# Patient Record
Sex: FEMALE | Race: ASIAN | NOT HISPANIC OR LATINO | URBAN - METROPOLITAN AREA
[De-identification: names, ages, dates, MRNs, and addresses within clinical notes are randomized per-mention and may not be internally consistent; named-entity substitution may affect disease eponyms.]

---

## 2017-05-10 ENCOUNTER — EMERGENCY (EMERGENCY)
Facility: HOSPITAL | Age: 42
LOS: 1 days | Discharge: HOME | End: 2017-05-10
Attending: EMERGENCY MEDICINE | Admitting: EMERGENCY MEDICINE
Payer: COMMERCIAL

## 2017-05-10 VITALS
RESPIRATION RATE: 16 BRPM | OXYGEN SATURATION: 100 % | HEART RATE: 78 BPM | DIASTOLIC BLOOD PRESSURE: 70 MMHG | TEMPERATURE: 98 F | SYSTOLIC BLOOD PRESSURE: 115 MMHG

## 2017-05-10 VITALS
DIASTOLIC BLOOD PRESSURE: 83 MMHG | WEIGHT: 156.31 LBS | HEIGHT: 64 IN | RESPIRATION RATE: 18 BRPM | TEMPERATURE: 98 F | SYSTOLIC BLOOD PRESSURE: 117 MMHG | OXYGEN SATURATION: 97 % | HEART RATE: 90 BPM

## 2017-05-10 DIAGNOSIS — R55 SYNCOPE AND COLLAPSE: ICD-10-CM

## 2017-05-10 DIAGNOSIS — Z79.2 LONG TERM (CURRENT) USE OF ANTIBIOTICS: ICD-10-CM

## 2017-05-10 DIAGNOSIS — Z79.899 OTHER LONG TERM (CURRENT) DRUG THERAPY: ICD-10-CM

## 2017-05-10 LAB
ALBUMIN SERPL ELPH-MCNC: 3.6 G/DL — SIGNIFICANT CHANGE UP (ref 3.4–5)
ALP SERPL-CCNC: 42 U/L — SIGNIFICANT CHANGE UP (ref 40–120)
ALT FLD-CCNC: 38 U/L — SIGNIFICANT CHANGE UP (ref 12–42)
ANION GAP SERPL CALC-SCNC: 2 MMOL/L — LOW (ref 9–16)
APPEARANCE UR: SIGNIFICANT CHANGE UP
AST SERPL-CCNC: 27 U/L — SIGNIFICANT CHANGE UP (ref 15–37)
BASOPHILS NFR BLD AUTO: 0.6 % — SIGNIFICANT CHANGE UP (ref 0–2)
BILIRUB SERPL-MCNC: 0.3 MG/DL — SIGNIFICANT CHANGE UP (ref 0.2–1.2)
BILIRUB UR-MCNC: NEGATIVE — SIGNIFICANT CHANGE UP
BUN SERPL-MCNC: 10 MG/DL — SIGNIFICANT CHANGE UP (ref 7–23)
CALCIUM SERPL-MCNC: 9.1 MG/DL — SIGNIFICANT CHANGE UP (ref 8.5–10.5)
CHLORIDE SERPL-SCNC: 107 MMOL/L — SIGNIFICANT CHANGE UP (ref 96–108)
CO2 SERPL-SCNC: 30 MMOL/L — SIGNIFICANT CHANGE UP (ref 22–31)
COLOR SPEC: YELLOW — SIGNIFICANT CHANGE UP
CREAT SERPL-MCNC: 0.67 MG/DL — SIGNIFICANT CHANGE UP (ref 0.5–1.3)
DIFF PNL FLD: (no result)
EOSINOPHIL NFR BLD AUTO: 4.5 % — SIGNIFICANT CHANGE UP (ref 0–6)
GLUCOSE SERPL-MCNC: 119 MG/DL — HIGH (ref 70–99)
GLUCOSE UR QL: NEGATIVE — SIGNIFICANT CHANGE UP
HCT VFR BLD CALC: 38.3 % — SIGNIFICANT CHANGE UP (ref 34.5–45)
HGB BLD-MCNC: 13.1 G/DL — SIGNIFICANT CHANGE UP (ref 11.5–15.5)
KETONES UR-MCNC: NEGATIVE — SIGNIFICANT CHANGE UP
LEUKOCYTE ESTERASE UR-ACNC: (no result)
LYMPHOCYTES # BLD AUTO: 24.4 % — SIGNIFICANT CHANGE UP (ref 13–44)
MCHC RBC-ENTMCNC: 30.8 PG — SIGNIFICANT CHANGE UP (ref 27–34)
MCHC RBC-ENTMCNC: 34.2 G/DL — SIGNIFICANT CHANGE UP (ref 32–36)
MCV RBC AUTO: 90.1 FL — SIGNIFICANT CHANGE UP (ref 80–100)
MONOCYTES NFR BLD AUTO: 9.3 % — SIGNIFICANT CHANGE UP (ref 2–14)
NEUTROPHILS NFR BLD AUTO: 61.2 % — SIGNIFICANT CHANGE UP (ref 43–77)
NITRITE UR-MCNC: NEGATIVE — SIGNIFICANT CHANGE UP
PH UR: 6 — SIGNIFICANT CHANGE UP (ref 5–8)
PLATELET # BLD AUTO: 279 K/UL — SIGNIFICANT CHANGE UP (ref 150–400)
POTASSIUM SERPL-MCNC: 4.3 MMOL/L — SIGNIFICANT CHANGE UP (ref 3.5–5.3)
POTASSIUM SERPL-SCNC: 4.3 MMOL/L — SIGNIFICANT CHANGE UP (ref 3.5–5.3)
PROT SERPL-MCNC: 7.3 G/DL — SIGNIFICANT CHANGE UP (ref 6.4–8.2)
PROT UR-MCNC: NEGATIVE MG/DL — SIGNIFICANT CHANGE UP
RBC # BLD: 4.25 M/UL — SIGNIFICANT CHANGE UP (ref 3.8–5.2)
RBC # FLD: 13.5 % — SIGNIFICANT CHANGE UP (ref 10.3–16.9)
SODIUM SERPL-SCNC: 139 MMOL/L — SIGNIFICANT CHANGE UP (ref 135–145)
SP GR SPEC: 1.01 — SIGNIFICANT CHANGE UP (ref 1–1.03)
UROBILINOGEN FLD QL: 0.2 E.U./DL — SIGNIFICANT CHANGE UP
WBC # BLD: 8.6 K/UL — SIGNIFICANT CHANGE UP (ref 3.8–10.5)
WBC # FLD AUTO: 8.6 K/UL — SIGNIFICANT CHANGE UP (ref 3.8–10.5)

## 2017-05-10 PROCEDURE — 70450 CT HEAD/BRAIN W/O DYE: CPT | Mod: 26

## 2017-05-10 PROCEDURE — 87086 URINE CULTURE/COLONY COUNT: CPT

## 2017-05-10 PROCEDURE — 99285 EMERGENCY DEPT VISIT HI MDM: CPT | Mod: 25

## 2017-05-10 PROCEDURE — 85025 COMPLETE CBC W/AUTO DIFF WBC: CPT

## 2017-05-10 PROCEDURE — 80053 COMPREHEN METABOLIC PANEL: CPT

## 2017-05-10 PROCEDURE — 81001 URINALYSIS AUTO W/SCOPE: CPT

## 2017-05-10 PROCEDURE — 93010 ELECTROCARDIOGRAM REPORT: CPT | Mod: NC

## 2017-05-10 PROCEDURE — 99284 EMERGENCY DEPT VISIT MOD MDM: CPT | Mod: 25

## 2017-05-10 PROCEDURE — 70450 CT HEAD/BRAIN W/O DYE: CPT

## 2017-05-10 PROCEDURE — 93005 ELECTROCARDIOGRAM TRACING: CPT

## 2017-05-10 PROCEDURE — 36415 COLL VENOUS BLD VENIPUNCTURE: CPT

## 2017-05-10 RX ORDER — NITROFURANTOIN MACROCRYSTAL 50 MG
1 CAPSULE ORAL
Qty: 14 | Refills: 0
Start: 2017-05-10 | End: 2017-05-17

## 2017-05-10 NOTE — ED PROVIDER NOTE - ATTENDING CONTRIBUTION TO CARE
40 yo female with brief syncopal event while walking 1 hr ago, pos [facility] - no focal neuro deficits on presentation- no ekg abnormalities noted - not orthostatic- no cardiac murmurs detected on exam - no other sx noted no tongue bite or evidence to suggest seizure.  labs and heat Ct unremarkable - VSS  well appearing now ambulates normally in ED may be dced

## 2017-05-10 NOTE — ED PROVIDER NOTE - MEDICAL DECISION MAKING DETAILS
40 y/o f syncopal episode today; EKG normal sinus, labs wnl, urine consistent with infection, culture sent.  Neuro exam intact,  CT head pending.  If head CT negative, possible post-concussive symptoms, will treat urine with macrobid.

## 2017-05-10 NOTE — ED PROVIDER NOTE - OBJECTIVE STATEMENT
40 y/o f with no pmh presents stating had a syncopal episode this morning around 8am.  Pt stating she was walking to work, had eaten breakfast, felt as though she was going to pass out, when asked to elaborate she is unable, stating it didn't feel dizzy, had no CP or SOB.  Pt stating she fell to the ground, hit her head, was witnessed by a bystander who reports she lost consciousness briefly.  Pt stating she went to work, has been having mild headache and "I just don't feel like myself."  Pt reports nausea, no vomiting.  Denies fever, abd pain, dysuria, all other ROS negative. 42 y/o f with no pmh presents stating had a syncopal episode this morning around 8 am.  Pt stating she was walking to work, had eaten breakfast, felt as though she was going to pass out, when asked to elaborate she is unable, stating it didn't feel dizzy, had no CP or SOB.  Pt stating she fell to the ground, hit her head, was witnessed by a bystander who reports she lost consciousness briefly.  Pt stating she went to work, has been having mild headache and "I just don't feel like myself."  Pt reports nausea, no vomiting.  Denies fever, abd pain, dysuria, all other ROS negative.

## 2017-05-12 LAB
CULTURE RESULTS: NO GROWTH — SIGNIFICANT CHANGE UP
SPECIMEN SOURCE: SIGNIFICANT CHANGE UP

## 2020-07-27 ENCOUNTER — INPATIENT (INPATIENT)
Facility: HOSPITAL | Age: 45
LOS: 0 days | Discharge: HOME | DRG: 379 | End: 2020-07-28
Attending: HOSPITALIST | Admitting: HOSPITALIST
Payer: COMMERCIAL

## 2020-07-27 VITALS
SYSTOLIC BLOOD PRESSURE: 127 MMHG | TEMPERATURE: 99 F | RESPIRATION RATE: 17 BRPM | WEIGHT: 169.98 LBS | OXYGEN SATURATION: 97 % | DIASTOLIC BLOOD PRESSURE: 90 MMHG | HEART RATE: 86 BPM | HEIGHT: 64 IN

## 2020-07-27 DIAGNOSIS — Z29.9 ENCOUNTER FOR PROPHYLACTIC MEASURES, UNSPECIFIED: ICD-10-CM

## 2020-07-27 LAB
ALBUMIN SERPL ELPH-MCNC: 3.9 G/DL — SIGNIFICANT CHANGE UP (ref 3.3–5)
ALP SERPL-CCNC: 54 U/L — SIGNIFICANT CHANGE UP (ref 40–120)
ALT FLD-CCNC: 7 U/L — LOW (ref 10–45)
ANION GAP SERPL CALC-SCNC: 8 MMOL/L — SIGNIFICANT CHANGE UP (ref 5–17)
APPEARANCE UR: CLEAR — SIGNIFICANT CHANGE UP
APTT BLD: 32.2 SEC — SIGNIFICANT CHANGE UP (ref 27.5–35.5)
AST SERPL-CCNC: 14 U/L — SIGNIFICANT CHANGE UP (ref 10–40)
BASOPHILS # BLD AUTO: 0.03 K/UL — SIGNIFICANT CHANGE UP (ref 0–0.2)
BASOPHILS NFR BLD AUTO: 0.3 % — SIGNIFICANT CHANGE UP (ref 0–2)
BILIRUB SERPL-MCNC: 0.2 MG/DL — SIGNIFICANT CHANGE UP (ref 0.2–1.2)
BILIRUB UR-MCNC: NEGATIVE — SIGNIFICANT CHANGE UP
BLD GP AB SCN SERPL QL: NEGATIVE — SIGNIFICANT CHANGE UP
BUN SERPL-MCNC: 10 MG/DL — SIGNIFICANT CHANGE UP (ref 7–23)
CALCIUM SERPL-MCNC: 8.8 MG/DL — SIGNIFICANT CHANGE UP (ref 8.4–10.5)
CHLORIDE SERPL-SCNC: 106 MMOL/L — SIGNIFICANT CHANGE UP (ref 96–108)
CO2 SERPL-SCNC: 27 MMOL/L — SIGNIFICANT CHANGE UP (ref 22–31)
COLOR SPEC: YELLOW — SIGNIFICANT CHANGE UP
CREAT SERPL-MCNC: 1.01 MG/DL — SIGNIFICANT CHANGE UP (ref 0.5–1.3)
DIFF PNL FLD: ABNORMAL
EOSINOPHIL # BLD AUTO: 0.06 K/UL — SIGNIFICANT CHANGE UP (ref 0–0.5)
EOSINOPHIL NFR BLD AUTO: 0.7 % — SIGNIFICANT CHANGE UP (ref 0–6)
GLUCOSE SERPL-MCNC: 132 MG/DL — HIGH (ref 70–99)
GLUCOSE UR QL: NEGATIVE — SIGNIFICANT CHANGE UP
HCT VFR BLD CALC: 32.8 % — LOW (ref 34.5–45)
HGB BLD-MCNC: 10.6 G/DL — LOW (ref 11.5–15.5)
IMM GRANULOCYTES NFR BLD AUTO: 0.2 % — SIGNIFICANT CHANGE UP (ref 0–1.5)
INR BLD: 1.04 — SIGNIFICANT CHANGE UP (ref 0.88–1.16)
KETONES UR-MCNC: NEGATIVE — SIGNIFICANT CHANGE UP
LEUKOCYTE ESTERASE UR-ACNC: ABNORMAL
LYMPHOCYTES # BLD AUTO: 1.36 K/UL — SIGNIFICANT CHANGE UP (ref 1–3.3)
LYMPHOCYTES # BLD AUTO: 15.8 % — SIGNIFICANT CHANGE UP (ref 13–44)
MCHC RBC-ENTMCNC: 30.4 PG — SIGNIFICANT CHANGE UP (ref 27–34)
MCHC RBC-ENTMCNC: 32.3 GM/DL — SIGNIFICANT CHANGE UP (ref 32–36)
MCV RBC AUTO: 94 FL — SIGNIFICANT CHANGE UP (ref 80–100)
MONOCYTES # BLD AUTO: 0.54 K/UL — SIGNIFICANT CHANGE UP (ref 0–0.9)
MONOCYTES NFR BLD AUTO: 6.3 % — SIGNIFICANT CHANGE UP (ref 2–14)
NEUTROPHILS # BLD AUTO: 6.58 K/UL — SIGNIFICANT CHANGE UP (ref 1.8–7.4)
NEUTROPHILS NFR BLD AUTO: 76.7 % — SIGNIFICANT CHANGE UP (ref 43–77)
NITRITE UR-MCNC: NEGATIVE — SIGNIFICANT CHANGE UP
NRBC # BLD: 0 /100 WBCS — SIGNIFICANT CHANGE UP (ref 0–0)
PH UR: 7.5 — SIGNIFICANT CHANGE UP (ref 5–8)
PLATELET # BLD AUTO: 320 K/UL — SIGNIFICANT CHANGE UP (ref 150–400)
POTASSIUM SERPL-MCNC: 4 MMOL/L — SIGNIFICANT CHANGE UP (ref 3.5–5.3)
POTASSIUM SERPL-SCNC: 4 MMOL/L — SIGNIFICANT CHANGE UP (ref 3.5–5.3)
PROT SERPL-MCNC: 6.7 G/DL — SIGNIFICANT CHANGE UP (ref 6–8.3)
PROT UR-MCNC: NEGATIVE MG/DL — SIGNIFICANT CHANGE UP
PROTHROM AB SERPL-ACNC: 12.5 SEC — SIGNIFICANT CHANGE UP (ref 10.6–13.6)
RBC # BLD: 3.49 M/UL — LOW (ref 3.8–5.2)
RBC # FLD: 13.6 % — SIGNIFICANT CHANGE UP (ref 10.3–14.5)
RH IG SCN BLD-IMP: POSITIVE — SIGNIFICANT CHANGE UP
SARS-COV-2 RNA SPEC QL NAA+PROBE: SIGNIFICANT CHANGE UP
SODIUM SERPL-SCNC: 141 MMOL/L — SIGNIFICANT CHANGE UP (ref 135–145)
SP GR SPEC: 1.01 — SIGNIFICANT CHANGE UP (ref 1–1.03)
UROBILINOGEN FLD QL: 0.2 E.U./DL — SIGNIFICANT CHANGE UP
WBC # BLD: 8.59 K/UL — SIGNIFICANT CHANGE UP (ref 3.8–10.5)
WBC # FLD AUTO: 8.59 K/UL — SIGNIFICANT CHANGE UP (ref 3.8–10.5)

## 2020-07-27 PROCEDURE — 74177 CT ABD & PELVIS W/CONTRAST: CPT | Mod: 26

## 2020-07-27 PROCEDURE — 99223 1ST HOSP IP/OBS HIGH 75: CPT

## 2020-07-27 PROCEDURE — 99285 EMERGENCY DEPT VISIT HI MDM: CPT | Mod: CR

## 2020-07-27 PROCEDURE — 99222 1ST HOSP IP/OBS MODERATE 55: CPT | Mod: GC

## 2020-07-27 RX ORDER — PIPERACILLIN AND TAZOBACTAM 4; .5 G/20ML; G/20ML
3.38 INJECTION, POWDER, LYOPHILIZED, FOR SOLUTION INTRAVENOUS EVERY 6 HOURS
Refills: 0 | Status: DISCONTINUED | OUTPATIENT
Start: 2020-07-27 | End: 2020-07-28

## 2020-07-27 RX ORDER — PANTOPRAZOLE SODIUM 20 MG/1
8 TABLET, DELAYED RELEASE ORAL
Qty: 80 | Refills: 0 | Status: DISCONTINUED | OUTPATIENT
Start: 2020-07-27 | End: 2020-07-28

## 2020-07-27 RX ORDER — METRONIDAZOLE 500 MG
500 TABLET ORAL ONCE
Refills: 0 | Status: COMPLETED | OUTPATIENT
Start: 2020-07-27 | End: 2020-07-27

## 2020-07-27 RX ORDER — PIPERACILLIN AND TAZOBACTAM 4; .5 G/20ML; G/20ML
3.38 INJECTION, POWDER, LYOPHILIZED, FOR SOLUTION INTRAVENOUS ONCE
Refills: 0 | Status: DISCONTINUED | OUTPATIENT
Start: 2020-07-27 | End: 2020-07-27

## 2020-07-27 RX ORDER — CEFTRIAXONE 500 MG/1
1000 INJECTION, POWDER, FOR SOLUTION INTRAMUSCULAR; INTRAVENOUS ONCE
Refills: 0 | Status: COMPLETED | OUTPATIENT
Start: 2020-07-27 | End: 2020-07-27

## 2020-07-27 RX ORDER — PIPERACILLIN AND TAZOBACTAM 4; .5 G/20ML; G/20ML
3.38 INJECTION, POWDER, LYOPHILIZED, FOR SOLUTION INTRAVENOUS EVERY 6 HOURS
Refills: 0 | Status: DISCONTINUED | OUTPATIENT
Start: 2020-07-27 | End: 2020-07-27

## 2020-07-27 RX ORDER — PANTOPRAZOLE SODIUM 20 MG/1
40 TABLET, DELAYED RELEASE ORAL ONCE
Refills: 0 | Status: COMPLETED | OUTPATIENT
Start: 2020-07-27 | End: 2020-07-27

## 2020-07-27 RX ORDER — SODIUM CHLORIDE 9 MG/ML
1000 INJECTION INTRAMUSCULAR; INTRAVENOUS; SUBCUTANEOUS ONCE
Refills: 0 | Status: COMPLETED | OUTPATIENT
Start: 2020-07-27 | End: 2020-07-27

## 2020-07-27 RX ORDER — IOHEXOL 300 MG/ML
30 INJECTION, SOLUTION INTRAVENOUS ONCE
Refills: 0 | Status: COMPLETED | OUTPATIENT
Start: 2020-07-27 | End: 2020-07-27

## 2020-07-27 RX ADMIN — CEFTRIAXONE 100 MILLIGRAM(S): 500 INJECTION, POWDER, FOR SOLUTION INTRAMUSCULAR; INTRAVENOUS at 13:42

## 2020-07-27 RX ADMIN — IOHEXOL 30 MILLILITER(S): 300 INJECTION, SOLUTION INTRAVENOUS at 11:08

## 2020-07-27 RX ADMIN — PANTOPRAZOLE SODIUM 10 MG/HR: 20 TABLET, DELAYED RELEASE ORAL at 14:53

## 2020-07-27 RX ADMIN — PANTOPRAZOLE SODIUM 40 MILLIGRAM(S): 20 TABLET, DELAYED RELEASE ORAL at 11:08

## 2020-07-27 RX ADMIN — SODIUM CHLORIDE 1000 MILLILITER(S): 9 INJECTION INTRAMUSCULAR; INTRAVENOUS; SUBCUTANEOUS at 11:08

## 2020-07-27 RX ADMIN — PIPERACILLIN AND TAZOBACTAM 200 GRAM(S): 4; .5 INJECTION, POWDER, LYOPHILIZED, FOR SOLUTION INTRAVENOUS at 21:08

## 2020-07-27 RX ADMIN — Medication 100 MILLIGRAM(S): at 14:17

## 2020-07-27 NOTE — H&P ADULT - PROBLEM SELECTOR PLAN 3
Pt had UA with >10 WBC, moderate leuk esterase. Has had no dysuria, urgency, incontinence.  -As patient has no urinary symptoms, will not treat at this time

## 2020-07-27 NOTE — H&P ADULT - NSHPPHYSICALEXAM_GEN_ALL_CORE
Vital Signs Last 12 Hrs  T(F): 97.4 (07-27-20 @ 13:53), Max: 99.1 (07-27-20 @ 09:52)  HR: 73 (07-27-20 @ 13:53) (73 - 86)  BP: 129/83 (07-27-20 @ 13:53) (127/90 - 129/83)  BP(mean): --  RR: 18 (07-27-20 @ 13:53) (17 - 18)  SpO2: 100% (07-27-20 @ 13:53) (97% - 100%)    PHYSICAL EXAM:  Constitutional: NAD, comfortable in bed.  HEENT: NC/AT, PERRLA, EOMI, no conjunctival pallor or scleral icterus, MMM  Neck: Supple, no JVD  Respiratory: CTA B/L. No w/r/r.   Cardiovascular: RRR, normal S1 and S2, no m/r/g.   Gastrointestinal: +BS, soft NTND, no guarding or rebound tenderness, no palpable masses   Extremities: wwp; no cyanosis, clubbing or edema.   Vascular: Pulses equal and strong throughout.   Neurological: AAOx3, no CN deficits, strength and sensation intact throughout.   Skin: No gross skin abnormalities or rashes

## 2020-07-27 NOTE — H&P ADULT - ASSESSMENT
44F PMH diverticulitis, daily NSAID use presents with episodes of bloody-streaked diarrhea, black stools over last 2 days. Hemodynamically stable, afebrile.

## 2020-07-27 NOTE — H&P ADULT - PROBLEM SELECTOR PLAN 1
Pt had black stools x 2 over last 2 days, as well as 1-2 episodes of reddish-maroon colored stool. Hgb 10.6 (Hgb 13 3 years ago). Has not had BM today. In history of chronic NSAID use and + melena on ED exam supports upper GI bleed.   -GI following, appreciate recs  -NPO after midnight for endoscopy   -Maintain active T&S  -Transfuse if Hgb < 7

## 2020-07-27 NOTE — CONSULT NOTE ADULT - ASSESSMENT
44F w/ a PMH significant for diverticulitis (August 2016) and migraines who presents to the ED w/ 2 days of hematochezia followed by melena. Hx significant for NSAID use. Previous Hgb 12.1 and 13.1, now presents with 10.6. Endorses lightheadedness. Also has LLQ pain, and CT findings consistent with acute diverticulitis. Of note, patient has previously had episode of diverticulitis in same area in 2016.     #Melena and hematochezia  Differential Diagnosis includes ulcer disease, angioectasias, esophagitis, erosive gastritis, dieulofoy lesion, cancer; could also be related to diverticular process occurring in colon  - Maintain active T&S, large bore IV access  - Transfusion threshold per primary team  - PPI IV gtt  - NPO at [facility] for EGD tomorrow    #Acute diverticulitis  - given acute inflammatory process in sigmoid colon, would hold off colonoscopic evaluation at this time  - agree with supportive care with abx, IVF  - patient can follow up with [facility] clinic on the fourth floor of [facility] [address] on Tuesday afternoons with myself and Dr. Simon ([phone]) to schedule colonoscopy    Haydee iKran MD  PGY-4, Gastroenterology Fellow  pager: [phone] 44F w/ a PMH significant for diverticulitis (August 2016) and migraines who presents to the ED w/ 2 days of hematochezia followed by melena. Hx significant for NSAID use. Previous Hgb 12.1 and 13.1, now presents with 10.6. Endorses lightheadedness. Also has LLQ pain, and CT findings consistent with acute diverticulitis. Of note, patient has previously had episode of diverticulitis in same area in 2016.     #Melena and hematochezia  Differential Diagnosis includes ulcer disease, angioectasias, esophagitis, erosive gastritis, dieulofoy lesion, cancer; could also be related to diverticular process occurring in colon  - Maintain active T&S, large bore IV access  - Transfusion threshold per primary team  - PPI IV gtt  - NPO at [facility] for EGD tomorrow    #Acute diverticulitis  - given acute inflammatory process in sigmoid colon, would hold off colonoscopic evaluation at this time  - agree with supportive care with IVF, bowel rest; will defer abx therapy to primary team  - patient can follow up with [facility] on the fourth floor of [address] on Tuesday afternoons with myself and Dr. Simon ([phone]) to schedule colonoscopy    Haydee Kiran MD  PGY-4, Gastroenterology Fellow  pager: [phone]

## 2020-07-27 NOTE — ED PROVIDER NOTE - CLINICAL SUMMARY MEDICAL DECISION MAKING FREE TEXT BOX
Patient well appearing, NAD and VSS. Hemodynamically stable with stable h/h. Ct scan shows diverticulitis with no perforation or abscess. Admitted for GI bleed and diverticulitis. Patient well appearing, NAD and VSS. Hemodynamically stable with stable h/h. Ct scan shows diverticulitis with no perforation or abscess. Admitted for GI bleed and diverticulitis. Case discuss with GI.

## 2020-07-27 NOTE — H&P ADULT - PROBLEM SELECTOR PLAN 2
Pt has had previous dx of diverticulitis years ago, currently has mild LLQ pain but says she has had on and off LLQ pain for years that is alleviated with BM. No h/o constipation. Abdominal exam benign. Pt afebrile, no leukocytosis, hemodynamically stable. CT abd reads possible mild recurrent diverticulitis vs chronic process.   -monitor CBC w diff  -monitor abdominal pain  -No abx at this time Pt has had previous dx of diverticulitis years ago, currently has mild LLQ pain with LLQ tenderness on exam with CT abd supporting mild recurrent diverticulitis dx. No h/o constipation .Pt afebrile, no leukocytosis, hemodynamically stable.   -zosyn 3.375g q6h  -monitor CBC w diff  -monitor abdominal pain

## 2020-07-27 NOTE — ED PROVIDER NOTE - ATTENDING CONTRIBUTION TO CARE
44F PMH migraine p/w blood stool. Had tooth extraction 6d ago and started on amox. No tooth bleeding. Now 3d of maroon colored stool, black stool yesterday. Also lightheaded, worse w/ standing. Mild nausea and fatigue. No prior EGD/colonoscopy. On motrin for tooth pain. Also minimal L abd discomfort x1-2d. Denies f/c, SOB/CP, vomiting, urinary complaints, rashes, LE pain/swelling, focal weakness/numbness. Not on AC. Vitals wnl, exam as above.  In ED: Mild anemia, other labs grossly wnl. CT w/ possible colitis. Given abd ttp, will tx for colitis.   Will admit for further care of likely UGIB.

## 2020-07-27 NOTE — H&P ADULT - HISTORY OF PRESENT ILLNESS
44F PMH diverticulitis, migraines presents with bloody stool x 2 days and 4 episodes of diarrhea over last 6 days. 6 days prior to admission, pt had a tooth extraction and started amoxicillin, which she says precipitated her diarrheal episodes. 4 days prior to admission, she says she has had intermittent sharp pains in her LLQ that continue to persist. 2 days prior to admission, her stool color changed to red/maroon for 1-2 episodes. Pt states over last 24 hours, she has had no diarrhea but has had black stools x 2. Of note, for years patient takes 200 mg ibuprofen 1x daily to control her migraines. Has been taking 600 mg ibuprofen daily for past 6 days to help with tooth pain. +lightheadedness, nausea, weakness. No fever/chills, vomiting, flank pain, urinary symptoms. No CP, SOB, sick contacts.     ED vitals: T 97.4, HR 73, /83 orthostatics negative, RR 18 SpO2 100% on RA   ED labs s/f: WBC 8.6, Hgb 10.6 (13 in '17), UA >10 WBC w moderate leuk esterase  In ED, received 1L NS, ceftriaxone 1g, flagyl 500 mg, protonix 40 IV, protonix gtt

## 2020-07-27 NOTE — ED PROVIDER NOTE - AGGRAVATING FACTORS
----- Message from Angela Brock sent at 11/15/2019 10:10 AM CST -----  Contact: daughter Venessa castillo [phone]  Patient's daughter Venessa Castillo called and asked for Breathing Machine and the supplies to be called into [facility] on [address].     
Message handled in another encounter.   
none

## 2020-07-27 NOTE — CONSULT NOTE ADULT - SUBJECTIVE AND OBJECTIVE BOX
GASTROENTEROLOGY CONSULT NOTE  HPI:  44F PMH diverticulitis, migraines presents with bloody stool x 2 days and 4 episodes of diarrhea over last 6 days. 6 days prior to admission, pt had a tooth extraction and started amoxicillin, which she says precipitated her diarrheal episodes. 4 days prior to admission, she says she has had intermittent sharp pains in her LLQ that continue to persist. 2 days prior to admission, her stool color changed to red/maroon for 1-2 episodes. Pt states over last 24 hours, she has had no diarrhea but has had black stools x 2. Of note, for years patient takes 200 mg ibuprofen 1x daily to control her migraines. Has been taking 600 mg ibuprofen daily for past 6 days to help with tooth pain. +lightheadedness, nausea, weakness. No fever/chills, vomiting, flank pain, urinary symptoms. No CP, SOB, sick contacts.     ED vitals: T 97.4, HR 73, /83 orthostatics negative, RR 18 SpO2 100% on RA   ED labs s/f: WBC 8.6, Hgb 10.6 (13 in '17), UA >10 WBC w moderate leuk esterase  In ED, received 1L NS, ceftriaxone 1g, flagyl 500 mg, protonix 40 IV, protonix gtt (27 Jul 2020 14:55)    GI consulted for melena. Above confirmed w/ slight following changes. Patient was on amoxicillin starting last Monday, and began to have diarrhea. 2 days prior to admission, patient noted 3 episodes of maroon colored stool, associated w/ LLQ tenderness. The following day, she noted black, tarry stool x2. She has not had any subsequent melena or hematochezia, and she has not had a BM since yesterday.     Allergies    No Known Allergies    Intolerances      Home Medications:    MEDICATIONS:  MEDICATIONS  (STANDING):  pantoprazole Infusion 8 mG/Hr (10 mL/Hr) IV Continuous <Continuous>    MEDICATIONS  (PRN):    PAST MEDICAL & SURGICAL HISTORY:  Migraines  No significant past surgical history    FAMILY HISTORY:  No pertinent family history in first degree relatives of GI cancer,  IBD, but mother has had diverticulitis     SOCIAL HISTORY:  Tobacco: [ ] Current, [ ] Former, [x ] Never; Pack Years:  Alcohol: none  Illicit Drugs: none    REVIEW OF SYSTEMS:  CONSTITUTIONAL: No weakness, fevers or chills  HEENT: No visual changes; No vertigo or throat pain   NECK: No pain or stiffness  RESPIRATORY: No cough, wheezing, hemoptysis; No shortness of breath  CARDIOVASCULAR: No chest pain or palpitations  GASTROINTESTINAL: No abdominal or epigastric pain. No nausea, vomiting, or hematemesis; No diarrhea or constipation. No melena or hematochezia.  GENITOURINARY: No dysuria, frequency or hematuria  NEUROLOGICAL: No numbness or weakness  SKIN: No itching, burning, rashes, or lesions   All other 10 review of systems is negative unless indicated above.    Vital Signs Last 24 Hrs  T(C): 36.8 (27 Jul 2020 15:39), Max: 37.3 (27 Jul 2020 09:52)  T(F): 98.2 (27 Jul 2020 15:39), Max: 99.1 (27 Jul 2020 09:52)  HR: 65 (27 Jul 2020 15:39) (65 - 86)  BP: 113/78 (27 Jul 2020 15:39) (113/78 - 129/83)  BP(mean): --  RR: 18 (27 Jul 2020 15:39) (17 - 18)  SpO2: 98% (27 Jul 2020 15:39) (97% - 100%)      PHYSICAL EXAM:    General: Well developed; well nourished; in no acute distress  Eyes: Anicteric sclerae, moist conjunctivae  HENT: Moist mucous membranes  Neck: Trachea midline, supple  Lungs: Normal respiratory effort, no intercostal retractions  Cardiovascular: RRR  Abdomen: Soft, minimally tender in LLQ; non-distended; Normal bowel sounds; No rebound or guarding  Extremities: Normal range of motion, No clubbing, cyanosis or edema  Neurological: Alert and oriented x3  Skin: Warm and dry. No obvious rash    LABS:                        10.6   8.59  )-----------( 320      ( 27 Jul 2020 11:08 )             32.8     07-27    141  |  106  |  10  ----------------------------<  132<H>  4.0   |  27  |  1.01    Ca    8.8      27 Jul 2020 11:08  Mg     2.2     07-27    TPro  6.7  /  Alb  3.9  /  TBili  0.2  /  DBili  x   /  AST  14  /  ALT  7<L>  /  AlkPhos  54  07-27        PT/INR - ( 27 Jul 2020 11:08 )   PT: 12.5 sec;   INR: 1.04          PTT - ( 27 Jul 2020 11:08 )  PTT:32.2 sec    RADIOLOGY & ADDITIONAL STUDIES:     Reviewed

## 2020-07-27 NOTE — ED PROVIDER NOTE - OBJECTIVE STATEMENT
45 y/o F with PMHX of migraine, presents to the ED w/ bloody stool for the past 2 days. Reports Tuesday had a tooth extraction and started taking amoxicillin for it on Tuesday, started having diarrhea for 3-4 episodes/day. On Saturday and Sunday noted blood with stool, also reports taking 600mg of ibruprofen on a daily basis since Tuesday for pain, but also generally takes for migraines. Admits to weakness/fatigue, nausea. Denies fever, vomiting, urinary symptoms. 43 y/o F with PMHX of migraine, presents to the ED w/ bloody stool for the past 2 days. Reports Tuesday had a tooth extraction and started taking amoxicillin for it on Tuesday, started having diarrhea for 3-4 episodes/day. On Saturday and Sunday noted blood with stool, also reports taking 600mg of ibuprofen on a daily basis since Tuesday for pain, but also generally takes for migraines. Admits to weakness/fatigue, nausea. Denies fever, vomiting, urinary symptoms. 43 y/o F with PMHX of migraine, presents to the ED w/ bloody stool for the past 2 days. Reports Tuesday had a tooth extraction and started taking amoxicillin. She began having diarrhea about  3-4 episodes/day. On Saturday and Sunday she noted bloody stools and also reports taking 600mg of ibuprofen on a daily basis since Tuesday for pain, but also generally takes for migraines. Admits to weakness/fatigue, nausea. Denies chest pain, sob,  fever, vomiting, urinary symptoms.

## 2020-07-27 NOTE — H&P ADULT - PROBLEM SELECTOR PLAN 4
F:  E: replete PRN  N: regular, NPO after midnight   Dvt ppx: SCD   GI ppx: protonix gtt   DISPO: [facility]   Code status: Full

## 2020-07-27 NOTE — H&P ADULT - NSHPLABSRESULTS_GEN_ALL_CORE
LABS:                         10.6   8.59  )-----------( 320      ( 2020 11:08 )             32.8         141  |  106  |  10  ----------------------------<  132<H>  4.0   |  27  |  1.01    Ca    8.8      2020 11:08  Mg     2.2         TPro  6.7  /  Alb  3.9  /  TBili  0.2  /  DBili  x   /  AST  14  /  ALT  7<L>  /  AlkPhos  54      PT/INR - ( 2020 11:08 )   PT: 12.5 sec;   INR: 1.04          PTT - ( 2020 11:08 )  PTT:32.2 sec  Urinalysis Basic - ( 2020 11:48 )    Color: Yellow / Appearance: Clear / S.015 / pH: x  Gluc: x / Ketone: NEGATIVE  / Bili: Negative / Urobili: 0.2 E.U./dL   Blood: x / Protein: NEGATIVE mg/dL / Nitrite: NEGATIVE   Leuk Esterase: Moderate / RBC: < 5 /HPF / WBC > 10 /HPF   Sq Epi: x / Non Sq Epi: Moderate /HPF / Bacteria: Present /HPF                RADIOLOGY, EKG & ADDITIONAL TESTS: Reviewed.

## 2020-07-28 ENCOUNTER — TRANSCRIPTION ENCOUNTER (OUTPATIENT)
Age: 45
End: 2020-07-28

## 2020-07-28 ENCOUNTER — RESULT REVIEW (OUTPATIENT)
Age: 45
End: 2020-07-28

## 2020-07-28 ENCOUNTER — APPOINTMENT (OUTPATIENT)
Dept: GASTROENTEROLOGY | Facility: HOSPITAL | Age: 45
End: 2020-07-28

## 2020-07-28 VITALS
DIASTOLIC BLOOD PRESSURE: 72 MMHG | SYSTOLIC BLOOD PRESSURE: 106 MMHG | TEMPERATURE: 97 F | HEART RATE: 60 BPM | RESPIRATION RATE: 18 BRPM | OXYGEN SATURATION: 97 %

## 2020-07-28 DIAGNOSIS — K57.92 DIVERTICULITIS OF INTESTINE, PART UNSPECIFIED, WITHOUT PERFORATION OR ABSCESS WITHOUT BLEEDING: ICD-10-CM

## 2020-07-28 LAB
ANION GAP SERPL CALC-SCNC: 10 MMOL/L — SIGNIFICANT CHANGE UP (ref 5–17)
BUN SERPL-MCNC: 4 MG/DL — LOW (ref 7–23)
CALCIUM SERPL-MCNC: 8.8 MG/DL — SIGNIFICANT CHANGE UP (ref 8.4–10.5)
CHLORIDE SERPL-SCNC: 107 MMOL/L — SIGNIFICANT CHANGE UP (ref 96–108)
CO2 SERPL-SCNC: 26 MMOL/L — SIGNIFICANT CHANGE UP (ref 22–31)
CREAT SERPL-MCNC: 0.72 MG/DL — SIGNIFICANT CHANGE UP (ref 0.5–1.3)
CULTURE RESULTS: SIGNIFICANT CHANGE UP
GLUCOSE SERPL-MCNC: 98 MG/DL — SIGNIFICANT CHANGE UP (ref 70–99)
HCT VFR BLD CALC: 34.4 % — LOW (ref 34.5–45)
HGB BLD-MCNC: 10.6 G/DL — LOW (ref 11.5–15.5)
INR BLD: 0.98 — SIGNIFICANT CHANGE UP (ref 0.88–1.16)
MAGNESIUM SERPL-MCNC: 2.3 MG/DL — SIGNIFICANT CHANGE UP (ref 1.6–2.6)
MCHC RBC-ENTMCNC: 29.5 PG — SIGNIFICANT CHANGE UP (ref 27–34)
MCHC RBC-ENTMCNC: 30.8 GM/DL — LOW (ref 32–36)
MCV RBC AUTO: 95.8 FL — SIGNIFICANT CHANGE UP (ref 80–100)
NRBC # BLD: 0 /100 WBCS — SIGNIFICANT CHANGE UP (ref 0–0)
PHOSPHATE SERPL-MCNC: 3.1 MG/DL — SIGNIFICANT CHANGE UP (ref 2.5–4.5)
PLATELET # BLD AUTO: 326 K/UL — SIGNIFICANT CHANGE UP (ref 150–400)
POTASSIUM SERPL-MCNC: 4 MMOL/L — SIGNIFICANT CHANGE UP (ref 3.5–5.3)
POTASSIUM SERPL-SCNC: 4 MMOL/L — SIGNIFICANT CHANGE UP (ref 3.5–5.3)
PROTHROM AB SERPL-ACNC: 11.8 SEC — SIGNIFICANT CHANGE UP (ref 10.6–13.6)
RBC # BLD: 3.59 M/UL — LOW (ref 3.8–5.2)
RBC # FLD: 13.6 % — SIGNIFICANT CHANGE UP (ref 10.3–14.5)
SODIUM SERPL-SCNC: 143 MMOL/L — SIGNIFICANT CHANGE UP (ref 135–145)
SPECIMEN SOURCE: SIGNIFICANT CHANGE UP
WBC # BLD: 6.9 K/UL — SIGNIFICANT CHANGE UP (ref 3.8–10.5)
WBC # FLD AUTO: 6.9 K/UL — SIGNIFICANT CHANGE UP (ref 3.8–10.5)

## 2020-07-28 PROCEDURE — 85027 COMPLETE CBC AUTOMATED: CPT

## 2020-07-28 PROCEDURE — 99239 HOSP IP/OBS DSCHRG MGMT >30: CPT | Mod: GC

## 2020-07-28 PROCEDURE — 96375 TX/PRO/DX INJ NEW DRUG ADDON: CPT

## 2020-07-28 PROCEDURE — 83735 ASSAY OF MAGNESIUM: CPT

## 2020-07-28 PROCEDURE — 87635 SARS-COV-2 COVID-19 AMP PRB: CPT

## 2020-07-28 PROCEDURE — 99285 EMERGENCY DEPT VISIT HI MDM: CPT | Mod: 25

## 2020-07-28 PROCEDURE — 36415 COLL VENOUS BLD VENIPUNCTURE: CPT

## 2020-07-28 PROCEDURE — 74177 CT ABD & PELVIS W/CONTRAST: CPT

## 2020-07-28 PROCEDURE — 86901 BLOOD TYPING SEROLOGIC RH(D): CPT

## 2020-07-28 PROCEDURE — 81001 URINALYSIS AUTO W/SCOPE: CPT

## 2020-07-28 PROCEDURE — 88305 TISSUE EXAM BY PATHOLOGIST: CPT | Mod: 26

## 2020-07-28 PROCEDURE — 85730 THROMBOPLASTIN TIME PARTIAL: CPT

## 2020-07-28 PROCEDURE — 96374 THER/PROPH/DIAG INJ IV PUSH: CPT | Mod: XU

## 2020-07-28 PROCEDURE — 85025 COMPLETE CBC W/AUTO DIFF WBC: CPT

## 2020-07-28 PROCEDURE — 85610 PROTHROMBIN TIME: CPT

## 2020-07-28 PROCEDURE — 80048 BASIC METABOLIC PNL TOTAL CA: CPT

## 2020-07-28 PROCEDURE — 43239 EGD BIOPSY SINGLE/MULTIPLE: CPT

## 2020-07-28 PROCEDURE — 88305 TISSUE EXAM BY PATHOLOGIST: CPT

## 2020-07-28 PROCEDURE — 80053 COMPREHEN METABOLIC PANEL: CPT

## 2020-07-28 PROCEDURE — 84100 ASSAY OF PHOSPHORUS: CPT

## 2020-07-28 PROCEDURE — 87086 URINE CULTURE/COLONY COUNT: CPT

## 2020-07-28 PROCEDURE — 86850 RBC ANTIBODY SCREEN: CPT

## 2020-07-28 RX ORDER — PANTOPRAZOLE SODIUM 20 MG/1
1 TABLET, DELAYED RELEASE ORAL
Qty: 0 | Refills: 0 | DISCHARGE

## 2020-07-28 RX ORDER — METRONIDAZOLE 500 MG
1 TABLET ORAL
Qty: 27 | Refills: 0
Start: 2020-07-28 | End: 2020-08-05

## 2020-07-28 RX ORDER — CEFPODOXIME PROXETIL 100 MG
1 TABLET ORAL
Qty: 18 | Refills: 0
Start: 2020-07-28 | End: 2020-08-05

## 2020-07-28 RX ORDER — ACETAMINOPHEN 500 MG
650 TABLET ORAL EVERY 6 HOURS
Refills: 0 | Status: DISCONTINUED | OUTPATIENT
Start: 2020-07-28 | End: 2020-07-28

## 2020-07-28 RX ORDER — PANTOPRAZOLE SODIUM 20 MG/1
1 TABLET, DELAYED RELEASE ORAL
Qty: 14 | Refills: 0
Start: 2020-07-28 | End: 2020-08-10

## 2020-07-28 RX ADMIN — PIPERACILLIN AND TAZOBACTAM 200 GRAM(S): 4; .5 INJECTION, POWDER, LYOPHILIZED, FOR SOLUTION INTRAVENOUS at 09:58

## 2020-07-28 RX ADMIN — PIPERACILLIN AND TAZOBACTAM 200 GRAM(S): 4; .5 INJECTION, POWDER, LYOPHILIZED, FOR SOLUTION INTRAVENOUS at 04:25

## 2020-07-28 RX ADMIN — Medication 650 MILLIGRAM(S): at 16:49

## 2020-07-28 RX ADMIN — Medication 650 MILLIGRAM(S): at 15:18

## 2020-07-28 RX ADMIN — PANTOPRAZOLE SODIUM 10 MG/HR: 20 TABLET, DELAYED RELEASE ORAL at 04:25

## 2020-07-28 NOTE — MEDICAL STUDENT ADULT H&P (EDUCATION) - NS MD HP STUD RESULTS RAD FT
Abdominal CT significant for diffuse colonic diverticulosis, slight infiltration of fat surrounding proximal sigmoid colon in LLQ, interpreted as possibly due to mild recurrent diverticulitis or chronic diverticular disease

## 2020-07-28 NOTE — CHART NOTE - NSCHARTNOTEFT_GEN_A_CORE
Patient is s/p EGD w/ the following findings and recommendations.     Impressions:  1. Hiatal hernia  2. Pre-pyloric, hyperplastic fold  3. Small, gastric polyps                Recommendations:  1. Follow up pathology  2. PPI once daily x2 weeks  3. Advance diet as tolerated               Haydee Kiran MD  PGY-4, Gastroenterology Fellow  pager: [phone]

## 2020-07-28 NOTE — DISCHARGE NOTE PROVIDER - NSDCMRMEDTOKEN_GEN_ALL_CORE_FT
Cipro 500 mg oral tablet: 1 tab(s) orally every 12 hours  Flagyl 500 mg oral tablet: 1 tab(s) orally every 8 hours  Macrobid 100 mg oral capsule: 1 cap(s) orally 2 times a day Protonix 40 mg oral delayed release tablet: 1 tab(s) orally once a day cefpodoxime 200 mg oral tablet: 1 tab(s) orally 2 times a day starting night of 7/28  Flagyl 500 mg oral tablet: 1 tab(s) orally every 8 hours  Protonix 40 mg oral delayed release tablet: 1 tab(s) orally once a day

## 2020-07-28 NOTE — DISCHARGE NOTE PROVIDER - NSDCCPCAREPLAN_GEN_ALL_CORE_FT
PRINCIPAL DISCHARGE DIAGNOSIS  Diagnosis: Diverticulitis  Assessment and Plan of Treatment: Diverticulitis is a disorder that can cause belly pain, fever, and problems with bowel movements. The food we eat travels from the stomach through a long tube called the intestine. The last part of that tube is the colon. The colon sometimes has small pouches in its walls. These pouches are called "diverticula." Many people who have these pouches have no symptoms. Diverticulitis happens when these pouches develop a small tear also known as a "microperforation," which then become infected and cause symptoms. The most common symptom of diverticulitis is pain, which is usually in the lower part of the belly. Other symptoms can include fever, constipation, diarrhea, nausea and vomiting. You had more mild symptoms and were treated with antibiotics while in the hospital. You will be given antibiotics to continue once you leave the hospital. It is important to complete your entire course of antibiotics to ensure you are treated adequately and that you do not develop antibiotic resistance. We also recommend you have a colonoscopy. During a colonoscopy, the doctor can look directly inside your colon to get an idea of the number of diverticula in your colon and to find out where they are. At the same time, he or she can check for signs of cancer.

## 2020-07-28 NOTE — DISCHARGE NOTE NURSING/CASE MANAGEMENT/SOCIAL WORK - NSDCFUADDAPPT_GEN_ALL_CORE_FT
Please follow up with Dr. Haydee Kiran and Dr. Nic Simon (Gastroenterology team) on a Tuesday afternoon to schedule colonoscopy ([phone]).     [address] [place] [address]

## 2020-07-28 NOTE — MEDICAL STUDENT ADULT H&P (EDUCATION) - NS MD HP STUD ASPLAN ASSES FT
Doreen Eugene is a 44-yo woman with PMH diverticulitis and migraines on day 2 of hospital admission after presenting acutely for bloody stool, with abdominal CT consistent with diverticulitis and CBC suggestive of normocytic anemia.

## 2020-07-28 NOTE — MEDICAL STUDENT ADULT H&P (EDUCATION) - NS MD HP STUD RESULTS LAB FT
CBC: significant for RBC count 3.59, Hgb 10.6, Hct 34.4, MCHC 30.8  General chemistry: significant for urea 4

## 2020-07-28 NOTE — DISCHARGE NOTE PROVIDER - HOSPITAL COURSE
#Discharge: do not delete        Patient is 45 yo F with past medical history of diverticulitis and migraines    Presented with bloody stool x2 days and 4 episodes of diarrhea over the last 6 days, found to have no acute bleed on EGD and small gastric polyps.    Problem List/Main Diagnoses (system-based):     R/O GI bleed.  Plan: Pt had black stools x 2 over last 2 days, as well as 1-2 episodes of reddish-maroon colored stool. Hgb 10.6 (Hgb 13 3 years ago). Has not had BM today. In history of chronic NSAID use and + melena on ED exam supports upper GI bleed.     -GI following, appreciate recs    -NPO after midnight for endoscopy     -Maintain active T&S    -Transfuse if Hgb < 7.         R/O Diverticulitis.  Plan: Pt has had previous dx of diverticulitis years ago, currently has mild LLQ pain with LLQ tenderness on exam with CT abd supporting mild recurrent diverticulitis dx. No h/o constipation .Pt afebrile, no leukocytosis, hemodynamically stable.     -zosyn 3.375g q6h    -monitor CBC w diff    -monitor abdominal pain.          Problem/Plan - 3:    ·  Problem: R/O Urinary tract infection without hematuria, site unspecified.  Plan: Pt had UA with >10 WBC, moderate leuk esterase. Has had no dysuria, urgency, incontinence.    -As patient has no urinary symptoms, will not treat at this time.                     Inpatient treatment course:     New medications:     Labs to be followed outpatient:     Exam to be followed outpatient: #Discharge: do not delete        Patient is 43 yo F with past medical history of diverticulitis and migraines    Presented with bloody stool x2 days and 4 episodes of diarrhea over the last 6 days, found to have no acute bleed on EGD and small gastric polyps.    Problem List/Main Diagnoses (system-based):         GI    #R/O GI bleed    Pt had black stools x 2 over last 2 days, as well as 1-2 episodes of reddish-maroon colored stool. Hgb 10.6 (Hgb 13 3 years ago). Has not had BM today. In history of chronic NSAID use and + melena on ED exam supports upper GI bleed. EGD w/ GI showed hiatal hernia, pre-pyloric, hyperplastic fold, small gastric polyps.     -Hgb remained stable and no transfusions required    -GI recommends PPI once daily x2 weeks and advancing diet as tolerated        #Diverticulitis.    Pt has had previous dx of diverticulitis years ago, currently has mild LLQ pain with LLQ tenderness on exam with CT abd supporting mild recurrent diverticulitis dx. No h/o constipation. Pt afebrile, no leukocytosis, hemodynamically stable.     -Patient s/p 1 dose flagyl and ceftriaxone    -Zosyn 3.375g q6h    -EGD w/ GI as above            #R/O Urinary tract infection    Pt had UA with >10 WBC, moderate leuk esterase. Has had no dysuria, urgency, incontinence.    -As patient has no urinary symptoms, given antibiotics for diverticulitis as above    -No additional antibiotics given for UTI            Inpatient treatment course: 7/27-Patient presented with bloody stool x2 days and 4 episodes of diarrhea over last 6 days and intermittent sharp LLQ pain. < from: CT Abdomen and Pelvis w/ oral and IV contrast showed colonic diverticulosis. Mild infiltration of the fat surrounding the proximal sigmoid colon in the left lower quadrant could be due to mild recurrent diverticulitis or chronic diverticular disease; given ceftriaxone x1, 500mg flagyl x1, protonix 40 IV, started on protonix gtt in ED. Patient started on zosyn            New medications:     Labs to be followed outpatient:     Exam to be followed outpatient: #Discharge: do not delete        Patient is 43 yo F with past medical history of diverticulitis and migraines and recent treatment w/ amoxicillin for tooth extraction    Presented with bloody stool x2 days and 4 episodes of diarrhea over the last 6 days, found to have no acute bleed on EGD and small gastric polyps.    Problem List/Main Diagnoses (system-based):         GI    #R/O GI bleed    Pt had black stools x 2 over last 2 days, as well as 1-2 episodes of reddish-maroon colored stool. Hgb 10.6 (Hgb 13 3 years ago). Has not had BM today. In history of chronic NSAID use and + melena on ED exam supports upper GI bleed. EGD w/ GI showed hiatal hernia, pre-pyloric, hyperplastic fold, small gastric polyps.     -Hgb remained stable and no transfusions required    -GI recommends PPI once daily x2 weeks and advancing diet as tolerated        #Diverticulitis.    Pt has had previous dx of diverticulitis years ago, currently has mild LLQ pain with LLQ tenderness on exam with CT abd supporting mild recurrent diverticulitis dx. No h/o constipation. Pt afebrile, no leukocytosis, hemodynamically stable. EGD w/ GI as above    -Patient s/p 1 dose flagyl and ceftriaxone    -Zosyn 3.375g q6h    -Will continue w/ cefpodoxime 200mg PO bid for 9 days and flagyl 500mg PO tid for 9 days as outpatient            #R/O Urinary tract infection    Pt had UA with >10 WBC, moderate leuk esterase. Has had no dysuria, urgency, incontinence.    -As patient has no urinary symptoms, given antibiotics for diverticulitis as above    -No additional antibiotics given for UTI        Inpatient treatment course: 7/27-Patient presented with bloody stool x2 days and 4 episodes of diarrhea over last 6 days and intermittent sharp LLQ pain. < from: CT Abdomen and Pelvis w/ oral and IV contrast showed colonic diverticulosis. Mild infiltration of the fat surrounding the proximal sigmoid colon in the left lower quadrant could be due to mild recurrent diverticulitis or chronic diverticular disease; given ceftriaxone x1, 500mg flagyl x1, protonix 40 IV, started on protonix gtt in ED. Patient started on zosyn; 7/28-EGD w/ GI significant for hiatal hernia; pre-pyloric, hyperplastic fold; small gastric polyps. will be d/c on cefpodoxime 200mg PO bid for 9 days, flagyl 500mg PO tid for 9 days, protonix 40mg PO daily for 14 days            New medications: cefpodoxime 200mg PO bid for 9 days, flagyl 500mg PO tid for 9 days, protonix 40mg PO daily for 14 days    Labs to be followed outpatient:     Exam to be followed outpatient:     Colonoscopy #Discharge: do not delete        Patient is 45 yo F with past medical history of diverticulitis and migraines and recent treatment w/ amoxicillin for tooth extraction    Presented with bloody stool x2 days and 4 episodes of diarrhea over the last 6 days, found to have no acute bleed on EGD and small gastric polyps.    Problem List/Main Diagnoses (system-based):         GI    #R/O GI bleed    Pt had black stools x 2 over last 2 days, as well as 1-2 episodes of reddish-maroon colored stool. Hgb 10.6 (Hgb 13 3 years ago). Has not had BM today. In history of chronic NSAID use and + melena on ED exam supports upper GI bleed. EGD w/ GI showed hiatal hernia, pre-pyloric, hyperplastic fold, small gastric polyps.     -Hgb remained stable and no transfusions required    -GI recommends PPI once daily x2 weeks and advancing diet as tolerated        #Diverticulitis    Pt has had previous dx of diverticulitis years ago, presented with mild LLQ pain with LLQ tenderness on exam, CT abd supporting mild recurrent diverticulitis dx. No h/o constipation. Pt afebrile, no leukocytosis, hemodynamically stable. EGD w/ GI as above    -Patient s/p 1 dose flagyl and ceftriaxone    -Patient started on Zosyn 3.375g q6h    -Will continue w/ cefpodoxime 200mg PO bid for 9 days and flagyl 500mg PO tid for 9 days as outpatient            #R/O Urinary tract infection    Pt had UA with >10 WBC, moderate leuk esterase. Has had no dysuria, urgency, incontinence.    -As patient has no urinary symptoms, given antibiotics for diverticulitis as above    -No additional antibiotics given for UTI        Inpatient treatment course: 7/27-Patient presented with bloody stool x2 days and 4 episodes of diarrhea over last 6 days and intermittent sharp LLQ pain. CT Abdomen and Pelvis w/ oral and IV contrast showed colonic diverticulosis. Mild infiltration of the fat surrounding the proximal sigmoid colon in the left lower quadrant could be due to mild recurrent diverticulitis or chronic diverticular disease; given ceftriaxone x1, 500mg flagyl x1, protonix 40 IV, started on protonix gtt in ED. Patient started on zosyn; 7/28-EGD w/ GI significant for hiatal hernia; pre-pyloric, hyperplastic fold; small gastric polyps. Will be d/c on cefpodoxime 200mg PO bid for 9 days, flagyl 500mg PO tid for 9 days, protonix 40mg PO daily for 14 days.            New medications: cefpodoxime 200mg PO bid for 9 days, flagyl 500mg PO tid for 9 days, protonix 40mg PO daily for 14 days    Labs to be followed outpatient:     Exam to be followed outpatient:     Colonoscopy

## 2020-07-28 NOTE — MEDICAL STUDENT ADULT H&P (EDUCATION) - NS MD HP STUD HX OF PRESENT ILLNESS FT
Doreen Eugene is a 44-yo woman with PMH Doreen Eugene is a 44-yo woman with PMH diverticulosis and migraines on day 2 of hospital admission after presenting acutely for bloody stool. On 7/25she noted dark purple bloody diarrhea that occurred 3 times. On 7/26, she had dark stool that was more solid. There was no fever associated with these events. She has had "cold symptoms" for the last few months before the event, and for the last week she was taking amoxicillin, Aspirin, and Tylenol during the past week; amoxicillin was for prophylaxis before a tooth procedure, and advil and tylenol were for her migraines. For the last week she's felt a bit nauseous, and she felt like passing out. She has also had L sided abdominal pain that feels better after bowel movements. She describes the pain as sharp. She has also noted nausea, lightheadedness, and fatigue. She also noted increased sweating on 7/26. She denies fever, chills, shortness of breath. She has not had a colonoscopy. She attributes her symptoms to the use of amoxicillin with aspirin and tylenol. Doreen Eugene is a 44-yo woman with PMH diverticulitis and migraines on day 2 of hospital admission after presenting acutely for bloody stool. On 7/25she noted dark purple bloody diarrhea that occurred 3 times. On 7/26, she had dark stool that was more solid. There was no fever associated with these events. She has had "cold symptoms" for the last few months before the event, and for the last week she was taking amoxicillin, Aspirin, and Tylenol during the past week; amoxicillin was for prophylaxis before a tooth procedure, and advil and tylenol were for her migraines. For the last week she's felt a bit nauseous, and she felt like passing out. She has also had L sided abdominal pain that feels better after bowel movements. She describes the pain as sharp. She has also noted nausea, lightheadedness, and fatigue. She also noted increased sweating on 7/26. She denies fever, chills, shortness of breath. She has not had a colonoscopy. She attributes her symptoms to the use of amoxicillin with aspirin and tylenol.     In the ED, vitals were T 97.4F, HR 73, /83 orthostatic negative, RR 18, SpO2 100% on RA  Labs in the ED were significant for WBC 8.6, Hgb 10.6 (13 in 2017), UA significant for moderate leuk esterase, trace blood, >10 WBC, moderate epithelial cells, bacteria present, a few yeast present  Treatment in ED included 1L normal saline, ceftriaxone 1g, flagyl 500mg, protonix 40 IV, protonix ggt

## 2020-07-28 NOTE — DISCHARGE NOTE PROVIDER - CARE PROVIDER_API CALL
Nic Simon  [address] [place] [address]  Phone: [phone]  Fax: (   )    -  Follow Up Time:

## 2020-07-28 NOTE — DISCHARGE NOTE PROVIDER - PROVIDER TOKENS
FREE:[LAST:[[facility]ora],FIRST:[Nic],PHONE:[phone]],FAX:[phone]   [phone]    -],ADDRESS:[[address] [place] [address]]]

## 2020-07-28 NOTE — DISCHARGE NOTE PROVIDER - NSDCFUADDAPPT_GEN_ALL_CORE_FT
Please follow up with Dr. Haydee Kiran and Dr. Nic Simon (Gastroenterology team) on a Tuesday afternoon to schedule colonoscopy ([phone]).     [address] [place] [address]

## 2020-07-28 NOTE — DISCHARGE NOTE NURSING/CASE MANAGEMENT/SOCIAL WORK - PATIENT PORTAL LINK FT
You can access the FollowMyHealth Patient Portal offered by [facility] by registering at the following website: [email or web address]. By joining [facility] FollowMyHealth portal, you will also be able to view your health information using other applications (apps) compatible with our system.

## 2020-07-28 NOTE — MEDICAL STUDENT ADULT H&P (EDUCATION) - NS MD HP STUD ASPLAN PLAN FT
1. Diverticulitis  Abdominal CT highly suggestive of diverticulitis. Patient should be prescribed cefpodoxime/flagel    2. R/O GI bleed  CBC shows normocytic anemia, currently NPO waiting for endoscopy to rule out upper GI bleed    3. Anemia  CBC shows normocytic anemia, iron studies should be conducted to differentiate etiology of anemia (blood loss vs. iron deficiency vs. anemia of chronic disease)

## 2020-07-29 LAB — SURGICAL PATHOLOGY STUDY: SIGNIFICANT CHANGE UP

## 2020-08-04 DIAGNOSIS — K31.7 POLYP OF STOMACH AND DUODENUM: ICD-10-CM

## 2020-08-04 DIAGNOSIS — D64.9 ANEMIA, UNSPECIFIED: ICD-10-CM

## 2020-08-04 DIAGNOSIS — Z79.899 OTHER LONG TERM (CURRENT) DRUG THERAPY: ICD-10-CM

## 2020-08-04 DIAGNOSIS — K57.33 DIVERTICULITIS OF LARGE INTESTINE WITHOUT PERFORATION OR ABSCESS WITH BLEEDING: ICD-10-CM

## 2020-08-04 DIAGNOSIS — K57.93 DIVERTICULITIS OF INTESTINE, PART UNSPECIFIED, WITHOUT PERFORATION OR ABSCESS WITH BLEEDING: ICD-10-CM

## 2021-09-20 PROBLEM — Z00.00 ENCOUNTER FOR PREVENTIVE HEALTH EXAMINATION: Status: ACTIVE | Noted: 2021-09-20
